# Patient Record
Sex: FEMALE | Race: BLACK OR AFRICAN AMERICAN | Employment: FULL TIME | URBAN - METROPOLITAN AREA
[De-identification: names, ages, dates, MRNs, and addresses within clinical notes are randomized per-mention and may not be internally consistent; named-entity substitution may affect disease eponyms.]

---

## 2017-12-12 ENCOUNTER — TELEPHONE (OUTPATIENT)
Dept: FAMILY MEDICINE CLINIC | Age: 22
End: 2017-12-12

## 2017-12-12 DIAGNOSIS — Z00.00 ROUTINE GENERAL MEDICAL EXAMINATION AT A HEALTH CARE FACILITY: Primary | ICD-10-CM

## 2018-01-03 ENCOUNTER — HOSPITAL ENCOUNTER (OUTPATIENT)
Dept: LAB | Age: 23
Discharge: HOME | End: 2018-01-03

## 2018-01-03 PROCEDURE — 99001 SPECIMEN HANDLING PT-LAB: CPT | Performed by: INTERNAL MEDICINE

## 2018-01-04 LAB
ALBUMIN SERPL-MCNC: 3.8 G/DL (ref 3.5–5.5)
ALBUMIN/GLOB SERPL: 1.3 {RATIO} (ref 1.2–2.2)
ALP SERPL-CCNC: 58 IU/L (ref 39–117)
ALT SERPL-CCNC: 12 IU/L (ref 0–32)
AST SERPL-CCNC: 13 IU/L (ref 0–40)
BILIRUB SERPL-MCNC: <0.2 MG/DL (ref 0–1.2)
BUN SERPL-MCNC: 12 MG/DL (ref 6–20)
BUN/CREAT SERPL: 14 (ref 9–23)
CALCIUM SERPL-MCNC: 8.7 MG/DL (ref 8.7–10.2)
CHLORIDE SERPL-SCNC: 105 MMOL/L (ref 96–106)
CHOLEST SERPL-MCNC: 172 MG/DL (ref 100–199)
CO2 SERPL-SCNC: 25 MMOL/L (ref 18–29)
CREAT SERPL-MCNC: 0.85 MG/DL (ref 0.57–1)
ERYTHROCYTE [DISTWIDTH] IN BLOOD BY AUTOMATED COUNT: 16.6 % (ref 12.3–15.4)
GLOBULIN SER CALC-MCNC: 2.9 G/DL (ref 1.5–4.5)
GLUCOSE SERPL-MCNC: 86 MG/DL (ref 65–99)
HCT VFR BLD AUTO: 33.6 % (ref 34–46.6)
HDLC SERPL-MCNC: 69 MG/DL
HGB BLD-MCNC: 10.8 G/DL (ref 11.1–15.9)
INTERPRETATION, 910389: NORMAL
LDLC SERPL CALC-MCNC: 93 MG/DL (ref 0–99)
MCH RBC QN AUTO: 23.6 PG (ref 26.6–33)
MCHC RBC AUTO-ENTMCNC: 32.1 G/DL (ref 31.5–35.7)
MCV RBC AUTO: 74 FL (ref 79–97)
PLATELET # BLD AUTO: 274 X10E3/UL (ref 150–379)
POTASSIUM SERPL-SCNC: 4.7 MMOL/L (ref 3.5–5.2)
PROT SERPL-MCNC: 6.7 G/DL (ref 6–8.5)
RBC # BLD AUTO: 4.57 X10E6/UL (ref 3.77–5.28)
SODIUM SERPL-SCNC: 142 MMOL/L (ref 134–144)
TRIGL SERPL-MCNC: 52 MG/DL (ref 0–149)
VLDLC SERPL CALC-MCNC: 10 MG/DL (ref 5–40)
WBC # BLD AUTO: 4.8 X10E3/UL (ref 3.4–10.8)

## 2018-01-08 ENCOUNTER — OFFICE VISIT (OUTPATIENT)
Dept: FAMILY MEDICINE CLINIC | Age: 23
End: 2018-01-08

## 2018-01-08 VITALS
SYSTOLIC BLOOD PRESSURE: 94 MMHG | DIASTOLIC BLOOD PRESSURE: 64 MMHG | HEIGHT: 64 IN | WEIGHT: 190 LBS | TEMPERATURE: 98.6 F | HEART RATE: 95 BPM | RESPIRATION RATE: 18 BRPM | BODY MASS INDEX: 32.44 KG/M2 | OXYGEN SATURATION: 97 %

## 2018-01-08 DIAGNOSIS — Z00.00 ROUTINE GENERAL MEDICAL EXAMINATION AT A HEALTH CARE FACILITY: Primary | ICD-10-CM

## 2018-01-08 DIAGNOSIS — Z23 ENCOUNTER FOR IMMUNIZATION: ICD-10-CM

## 2018-01-08 DIAGNOSIS — E55.9 VITAMIN D DEFICIENCY: ICD-10-CM

## 2018-01-08 DIAGNOSIS — E66.09 CLASS 1 OBESITY DUE TO EXCESS CALORIES WITH SERIOUS COMORBIDITY AND BODY MASS INDEX (BMI) OF 32.0 TO 32.9 IN ADULT: ICD-10-CM

## 2018-01-08 RX ORDER — NORETHINDRONE ACETATE AND ETHINYL ESTRADIOL 1MG-20(21)
KIT ORAL
COMMUNITY

## 2018-01-08 RX ORDER — ERGOCALCIFEROL 1.25 MG/1
50000 CAPSULE ORAL
Qty: 12 CAP | Refills: 0 | COMMUNITY
Start: 2018-01-08

## 2018-01-08 NOTE — PATIENT INSTRUCTIONS

## 2018-01-08 NOTE — MR AVS SNAPSHOT
Visit Information Date & Time Provider Department Dept. Phone Encounter #  
 1/8/2018  1:30 PM Neelamstephanie Estrada, [facility] [phone] [id number] Follow-up Instructions Return in about 1 year (around 1/8/2019). Upcoming Health Maintenance Date Due  
 HPV AGE 9Y-34Y (2 of 3 - Female 3 Dose Series) 9/26/2011 DTaP/Tdap/Td series (6 - Td) 1/7/2018 PAP AKA CERVICAL CYTOLOGY 12/1/2020 Allergies as of 1/8/2018  Review Complete On: 1/8/2018 By: Neelam Estrada MD  
  
 Severity Noted Reaction Type Reactions Baby Oil [Lanolin-mineral Oil]  05/16/2012    Other (comments) Red patches on skin Current Immunizations  Reviewed on 8/12/2013 Name Date DTAP Vaccine 9/25/1998, 4/5/1995, 5/16/1994, 1/4/1994, 10/26/1983 HIB Vaccine 4/5/1995, 5/16/1994, 1/4/1994, 1993 Hep B Vaccine (Adult) 8/12/2013 Hepatitis A Vaccine 8/1/2011 Hepatitis B Vaccine 5/16/1994, 1993, 1995 Human Papillomavirus 8/1/2011 MMR Vaccine 9/25/1998, 9/28/1994 Meningococcal Vaccine 8/1/2011 OPV 9/25/1998, 5/16/1994, 1/4/1994, 1993 PPD 5/16/2012 TB Skin Test (PPD) Intradermal 8/12/2013 TDAP Vaccine 1/7/2008 Tdap  Incomplete Not reviewed this visit You Were Diagnosed With   
  
 Codes Comments Routine general medical examination at a health care facility    -  Primary ICD-10-CM: Z00.00 ICD-9-CM: V70.0 Class 1 obesity due to excess calories with serious comorbidity and body mass index (BMI) of 32.0 to 32.9 in adult     ICD-10-CM: E66.09, Z68.32 
ICD-9-CM: 278.00, V85.32 Encounter for immunization     ICD-10-CM: D27 ICD-9-CM: V03.89 Vitamin D deficiency     ICD-10-CM: E55.9 ICD-9-CM: 268.9 Vitals BP Pulse Temp Resp Height(growth percentile) Weight(growth percentile) 94/64 (BP 1 Location: Left arm, BP Patient Position: Sitting) 95 98.6 °F (37 °C) 18 5' 4\" (1.626 m) 190 lb (86.2 kg) SpO2 BMI OB Status Smoking Status 97% 32.61 kg/m2 Having regular periods Never Smoker BMI and BSA Data Body Mass Index Body Surface Area  
 32.61 kg/m 2 1.97 m 2 Preferred Pharmacy Pharmacy Name Phone [address] Ashley Almaguer [phone] Your Updated Medication List  
  
   
This list is accurate as of: 1/8/18  2:05 PM.  Always use your most recent med list.  
  
  
  
  
 FLONASE 50 mcg/actuation nasal spray Generic drug:  fluticasone  
nightly. MICROGESTIN FE 1/20 (28) 1 mg-20 mcg (21)/75 mg (7) Tab Generic drug:  norethindrone-ethinyl estradiol Take  by mouth. OTHER  
  
 VITAMIN D2 50,000 unit capsule Generic drug:  ergocalciferol Take 1 Cap by mouth every seven (7) days. We Performed the Following [place] IMMUNIZ ADMIN,1 SINGLE/COMB VAC/TOXOID [id number] CPT(R)] TETANUS, DIPHTHERIA TOXOIDS AND ACELLULAR PERTUSSIS VACCINE (TDAP), IN INDIVIDS. >=7, IM [id number] CPT(R)] Follow-up Instructions Return in about 1 year (around 1/8/2019). Patient Instructions Starting a Weight Loss Plan: Care Instructions Your Care Instructions If you are thinking about losing weight, it can be hard to know where to start. Your doctor can help you set up a weight loss plan that best meets your needs. You may want to take a class on nutrition or exercise, or join a weight loss support group. If you have questions about how to make changes to your eating or exercise habits, ask your doctor about seeing a registered dietitian or an exercise specialist. 
It can be a big challenge to lose weight. But you do not have to make huge changes at once. Make small changes, and stick with them. When those changes become habit, add a few more changes.  
If you do not think you are ready to make changes right now, try to pick a date in the future. Make an appointment to see your doctor to discuss whether the time is right for you to start a plan. Follow-up care is a key part of your treatment and safety. Be sure to make and go to all appointments, and call your doctor if you are having problems. It's also a good idea to know your test results and keep a list of the medicines you take. How can you care for yourself at home? · Set realistic goals. Many people expect to lose much more weight than is likely. A weight loss of 5% to 10% of your body weight may be enough to improve your health. · Get family and friends involved to provide support. Talk to them about why you are trying to lose weight, and ask them to help. They can help by participating in exercise and having meals with you, even if they may be eating something different. · Find what works best for you. If you do not have time or do not like to cook, a program that offers meal replacement bars or shakes may be better for you. Or if you like to prepare meals, finding a plan that includes daily menus and recipes may be best. 
· Ask your doctor about other health professionals who can help you achieve your weight loss goals. ¨ A dietitian can help you make healthy changes in your diet. ¨ An exercise specialist or  can help you develop a safe and effective exercise program. 
¨ A counselor or psychiatrist can help you cope with issues such as depression, anxiety, or family problems that can make it hard to focus on weight loss. · Consider joining a support group for people who are trying to lose weight. Your doctor can suggest groups in your area. Where can you learn more? Go to http://morgan-edi.info/. Enter W786 in the search box to learn more about \"Starting a Weight Loss Plan: Care Instructions. \" Current as of: October 13, 2016 Content Version: 11.4 © 5898-0846 Healthwise, Incorporated.  Care instructions adapted under license by [address] (which disclaims liability or warranty for this information). If you have questions about a medical condition or this instruction, always ask your healthcare professional. [address] any warranty or liability for your use of this information. Introducing [facility]! Daniela Alvarez introduces ZipList patient portal. Now you can access parts of your medical record, email your doctor's office, and request medication refills online. 1. In your internet browser, go to [email or web address] 2. Click on the First Time User? Click Here link in the Sign In box. You will see the New Member Sign Up page. 3. Enter your ZipList Access Code exactly as it appears below. You will not need to use this code after youve completed the sign-up process. If you do not sign up before the expiration date, you must request a new code. · ZipList Access Code: [email or web address] Expires: 4/8/2018  1:24 PM 
 
4. Enter the last four digits of your Social Security Number (xxxx) and Date of Birth (mm/dd/yyyy) as indicated and click Submit. You will be taken to the next sign-up page. 5. Create a ZipList ID. This will be your ZipList login ID and cannot be changed, so think of one that is secure and easy to remember. 6. Create a ZipList password. You can change your password at any time. 7. Enter your Password Reset Question and Answer. This can be used at a later time if you forget your password. 8. Enter your e-mail address. You will receive e-mail notification when new information is available in [address] 9. Click Sign Up. You can now view and download portions of your medical record. 10. Click the Download Summary menu link to download a portable copy of your medical information. If you have questions, please visit the Frequently Asked Questions section of the ZipList website.  Remember, ZipList is NOT to be used for urgent needs. For medical emergencies, dial 911. Now available from your iPhone and Android! Please provide this summary of care documentation to your next provider. Your primary care clinician is listed as Gibran Kendall. If you have any questions after today's visit, please call [phone].

## 2018-01-08 NOTE — PROGRESS NOTES
Assessment/Plan:    1. Routine general medical examination at a health care facility  -labs good    2. Class 1 obesity due to excess calories with serious comorbidity and body mass index (BMI) of 32.0 to 32.9 in adult  -she is working on diet/exercise    3. Encounter for immunization  - Tetanus, diphtheria toxoids and acellular pertussis (TDAP) vaccine, in individuals >=7 years, IM  - IN IMMUNIZ ADMIN,1 SINGLE/COMB VAC/TOXOID    4. Vitamin D deficiency  -on prescription dose from gyn. Then will take otc. The plan was discussed with the patient. The patient verbalized understanding and is in agreement with the plan. All medication potential side effects were discussed with the patient. Health Maintenance:   Health Maintenance   Topic Date Due    HPV AGE 9Y-34Y (2 of 3 - Female 3 Dose Series) 09/26/2011    DTaP/Tdap/Td series (6 - Td) 01/07/2018    PAP AKA CERVICAL CYTOLOGY  12/01/2020    Influenza Age 5 to Adult  Addressed       Ludmila Shelby is a 23 y.o. female and presents with Complete Physical     Subjective:  Here for physical.  Labs good, except for mild anemia. Obesity - is trying to eliminate fast food. Is working to increase her exercise. ROS:  Constitutional: No recent weight change. No weakness/fatigue. No f/c. Skin: No rashes, change in nails/hair, itching   HENT: No HA, dizziness. No hearing loss/tinnitus. No nasal congestion/discharge. Eyes: No change in vision, double/blurred vision or eye pain/redness. Cardiovascular: No CP/palpitations. No AWAD/orthopnea/PND. Respiratory: No cough/sputum, dyspnea, wheezing. Gastointestinal: No dysphagia, reflux. No n/v. No constipation/diarrhea. No melena/rectal bleeding. Genitourinary: No dysuria, urinary hesitancy, nocturia, hematuria. No incontinence. Musculoskeletal: No joint pain/stiffness. No muscle pain/tenderness. Endo: No heat/cold intolerance, no polyuria/polydypsia. Heme: + h/o anemia.   No easy bleeding/bruising. Allergy/Immunology: + seasonal rhinitis. Denies frequent colds, sinus/ear infections. Neurological: No seizures/numbness/weakness. No paresthesias. Psychiatric:  No depression, anxiety. The problem list was updated as a part of today's visit. Patient Active Problem List   Diagnosis Code    Obesity (BMI 30-39. 9) E66.9       The PSH, FH were reviewed. SH:  Social History   Substance Use Topics    Smoking status: Never Smoker    Smokeless tobacco: Never Used    Alcohol use No       Medications/Allergies:  Current Outpatient Prescriptions on File Prior to Visit   Medication Sig Dispense Refill    fluticasone (FLONASE) 50 mcg/actuation nasal spray nightly.  norethindrone-ethinyl estradiol (JUNEL FE 1/20) 1 mg-20 mcg (21)/75 mg (7) tab Take  by mouth.  ferrous sulfate 324 mg (65 mg iron) tablet Take  by mouth Daily (before breakfast).  multivitamin (ONE A DAY) tablet Take 1 Tab by mouth daily. No current facility-administered medications on file prior to visit. Allergies   Allergen Reactions    Baby Oil [Lanolin-Mineral Oil] Other (comments)     Red patches on skin       Objective:  Visit Vitals    BP 94/64 (BP 1 Location: Left arm, BP Patient Position: Sitting)    Pulse 95    Temp 98.6 °F (37 °C)    Resp 18    Ht 5' 4\" (1.626 m)    Wt 190 lb (86.2 kg)    SpO2 97%    BMI 32.61 kg/m2      Constitutional: Well developed, nourished, no distress, alert, obese habitus   HENT: Exterior ears and tympanic membranes normal bilaterally. Supple neck. No thyromegaly or lymphadenopathy. Oropharynx clear and moist mucous membranes. Eyes: Conjunctiva normal. PERRL. CV: S1, S2.  RRR. No murmurs/rubs. No thrills palpated. No carotid bruits. Intact distal pulses. No edema. Pulm: No abnormalities on inspection. Clear to auscultation bilaterally. No wheezing/rhonchi. Normal effort. GI: Soft, nontender, nondistended. Normal active bowel sounds. MS: Gait normal.  Joints without deformity/tenderness. Strength intact bilateral upper and lower ext. Normal ROM all extremities. Neuro: A/O x 3. No focal motor or sensory deficits. Speech normal.   Skin: No lesions/rashes on inspection. Psych: Appropriate affect, judgement and insight. Short-term memory intact. Labwork and Ancillary Studies:    CBC w/Diff  Lab Results   Component Value Date/Time    WBC 4.8 01/03/2018 07:48 AM    HGB 10.8 01/03/2018 07:48 AM    PLATELET 919 88/46/9087 07:48 AM         Basic Metabolic Profile/LFTs  Lab Results   Component Value Date/Time    Sodium 142 01/03/2018 07:48 AM    Potassium 4.7 01/03/2018 07:48 AM    Chloride 105 01/03/2018 07:48 AM    CO2 25 01/03/2018 07:48 AM    Glucose 86 01/03/2018 07:48 AM    BUN 12 01/03/2018 07:48 AM    Creatinine 0.85 01/03/2018 07:48 AM    BUN/Creatinine ratio 14 01/03/2018 07:48 AM    GFR est  01/03/2018 07:48 AM    GFR est non-AA 96 01/03/2018 07:48 AM    Calcium 8.7 01/03/2018 07:48 AM      Lab Results   Component Value Date/Time    ALT (SGPT) 12 01/03/2018 07:48 AM    AST (SGOT) 13 01/03/2018 07:48 AM    Alk.  phosphatase 58 01/03/2018 07:48 AM    Bilirubin, total <0.2 01/03/2018 07:48 AM       Cholesterol  Lab Results   Component Value Date/Time    Cholesterol, total 172 01/03/2018 07:48 AM    HDL Cholesterol 69 01/03/2018 07:48 AM    LDL, calculated 93 01/03/2018 07:48 AM    Triglyceride 52 01/03/2018 07:48 AM

## 2018-01-08 NOTE — PROGRESS NOTES
Chacho Liriano is a 23 y.o. female (: 1995) presenting to address:    Chief Complaint   Patient presents with    Complete Physical       Vitals:    18 1340   BP: 94/64   Pulse: 95   Resp: 18   Temp: 98.6 °F (37 °C)   SpO2: 97%   Weight: 190 lb (86.2 kg)   Height: 5' 4\" (1.626 m)   PainSc:   0 - No pain       Hearing/Vision:   No exam data present    Learning Assessment:     Learning Assessment 2013   PRIMARY LEARNER Patient   HIGHEST LEVEL OF EDUCATION - PRIMARY LEARNER  GRADUATED HIGH SCHOOL OR GED   BARRIERS PRIMARY LEARNER NONE   CO-LEARNER CAREGIVER No   PRIMARY LANGUAGE ENGLISH   LEARNER PREFERENCE PRIMARY DEMONSTRATION   LEARNING SPECIAL TOPICS No   ANSWERED BY Patient   RELATIONSHIP SELF     Depression Screening:     PHQ over the last two weeks 2018   Little interest or pleasure in doing things Not at all   Feeling down, depressed or hopeless Not at all   Total Score PHQ 2 0     Fall Risk Assessment:   No flowsheet data found. Abuse Screening:     Abuse Screening Questionnaire 2018   Do you ever feel afraid of your partner? N   Are you in a relationship with someone who physically or mentally threatens you? N   Is it safe for you to go home? Y     Coordination of Care Questionaire:   1. Have you been to the ER, urgent care clinic since your last visit? Hospitalized since your last visit? No     2. Have you seen or consulted any other health care providers outside of the [address] [place] [address] since your last visit? Include any pap smears or colon screening. Yes, Dr. Mikala Woodward pap smear/labs Dec 2017, Dr. Grant Brown eye exam Dec 2017 , dental   Advanced Directive:   1. Do you have an Advanced Directive? No     2. Would you like information on Advanced Directives?   Yes   Health Maintenance Due   Topic Date Due    HPV AGE 9Y-34Y (2 of 3 - Female 3 Dose Series) 2011    Influenza Age 5 to Adult  2017    DTaP/Tdap/Td series (6 - Td) 2018     Declines flu Pt states gyne started her on Vitamin D once per week. Immunization/s administered 1/8/2018 by Sen Madrigal, ALEXEY with guardian's consent. Patient tolerated procedure well. No reactions noted. TDAP left deltoid.

## 2018-07-31 ENCOUNTER — OFFICE VISIT (OUTPATIENT)
Dept: FAMILY MEDICINE CLINIC | Age: 23
End: 2018-07-31

## 2018-07-31 VITALS
HEIGHT: 64 IN | TEMPERATURE: 98.9 F | WEIGHT: 208 LBS | HEART RATE: 88 BPM | DIASTOLIC BLOOD PRESSURE: 78 MMHG | SYSTOLIC BLOOD PRESSURE: 112 MMHG | OXYGEN SATURATION: 99 % | RESPIRATION RATE: 16 BRPM | BODY MASS INDEX: 35.51 KG/M2

## 2018-07-31 DIAGNOSIS — L03.012 CELLULITIS OF FINGER OF LEFT HAND: Primary | ICD-10-CM

## 2018-07-31 PROBLEM — E66.01 SEVERE OBESITY (BMI 35.0-39.9): Status: ACTIVE | Noted: 2018-07-31

## 2018-07-31 RX ORDER — BISMUTH SUBSALICYLATE 262 MG
1 TABLET,CHEWABLE ORAL DAILY
COMMUNITY

## 2018-07-31 RX ORDER — CEPHALEXIN 500 MG/1
500 CAPSULE ORAL 3 TIMES DAILY
Qty: 30 CAP | Refills: 0 | Status: SHIPPED | OUTPATIENT
Start: 2018-07-31 | End: 2018-08-10

## 2018-07-31 NOTE — PROGRESS NOTES
Kami Finch is a 23 y.o. female (: 1995) presenting to address:    Chief Complaint   Patient presents with    Finger Swelling     LT hand          pain scale 2/10       Vitals:    18 1353   BP: 112/78   Pulse: 88   Resp: 16   Temp: 98.9 °F (37.2 °C)   TempSrc: Oral   SpO2: 99%   Weight: 208 lb (94.3 kg)   Height: 5' 4\" (1.626 m)   PainSc:   2   PainLoc: Finger       Hearing/Vision:   No exam data present    Learning Assessment:     Learning Assessment 2013   PRIMARY LEARNER Patient   HIGHEST LEVEL OF EDUCATION - PRIMARY LEARNER  GRADUATED HIGH SCHOOL OR GED   BARRIERS PRIMARY LEARNER NONE   CO-LEARNER CAREGIVER No   PRIMARY LANGUAGE ENGLISH   LEARNER PREFERENCE PRIMARY DEMONSTRATION   LEARNING SPECIAL TOPICS No   ANSWERED BY Patient   RELATIONSHIP SELF     Depression Screening:     PHQ over the last two weeks 2018   Little interest or pleasure in doing things Not at all   Feeling down, depressed, irritable, or hopeless Not at all   Total Score PHQ 2 0     Fall Risk Assessment:   No flowsheet data found. Abuse Screening:     Abuse Screening Questionnaire 2018   Do you ever feel afraid of your partner? N   Are you in a relationship with someone who physically or mentally threatens you? N   Is it safe for you to go home? Y     Coordination of Care Questionaire:   1. Have you been to the ER, urgent care clinic since your last visit? Hospitalized since your last visit? NO    2. Have you seen or consulted any other health care providers outside of the [address] [place] [address] since your last visit? Include any pap smears or colon screening. NO    Advanced Directive:   1. Do you have an Advanced Directive? NO    2. Would you like information on Advanced Directives?  NO

## 2018-07-31 NOTE — PROGRESS NOTES
Assessment/Plan:    *Diagnoses and all orders for this visit:    1. Cellulitis of finger of left hand    Other orders  -     cephALEXin (KEFLEX) 500 mg capsule; Take 1 Cap by mouth three (3) times daily for 10 days. Will call if she is not improving. The plan was discussed with the patient. The patient verbalized understanding and is in agreement with the plan. All medication potential side effects were discussed with the patient.    -------------------------------------------------------------------------------------------------------------------        Nataliya Solis is a 23 y.o. female and presents with Finger Swelling (LT hand          pain scale 2/10)         Subjective:  Pt here for an issue with her LT index finger. She pulled on a hangnail 3-4 days ago and now has redness, swelling, pain, pus that is visible. She has been soaking it in warm water but nothing else. ROS:  Constitutional: No recent weight change. No weakness/fatigue. No f/c. Skin: No rashes, change in nails/hair, itching   HENT: No HA, dizziness. No hearing loss/tinnitus. No nasal congestion/discharge. Eyes: No change in vision, double/blurred vision or eye pain/redness. Cardiovascular: No CP/palpitations. No AWAD/orthopnea/PND. Respiratory: No cough/sputum, dyspnea, wheezing. Gastointestinal: No dysphagia, reflux. No n/v. No constipation/diarrhea. No melena/rectal bleeding. Genitourinary: No dysuria, urinary hesitancy, nocturia, hematuria. No incontinence. Musculoskeletal: No joint pain/stiffness. No muscle pain/tenderness. Endo: No heat/cold intolerance, no polyuria/polydypsia. Heme: No h/o anemia. No easy bleeding/bruising. Allergy/Immunology: No seasonal rhinitis. Denies frequent colds, sinus/ear infections. Neurological: No seizures/numbness/weakness. No paresthesias. Psychiatric:  No depression, anxiety. The problem list was updated as a part of today's visit.   Patient Active Problem List   Diagnosis Code    Obesity (BMI 30-39. 9) E66.9    Vitamin D deficiency E55.9    Severe obesity (BMI 35.0-39.9) ([facility]) E66.01       The PSH, FH were reviewed. SH:  Social History   Substance Use Topics    Smoking status: Never Smoker    Smokeless tobacco: Never Used    Alcohol use No       Medications/Allergies:  Current Outpatient Prescriptions on File Prior to Visit   Medication Sig Dispense Refill    norethindrone-ethinyl estradiol (MICROGESTIN FE 1/20, 28,) 1 mg-20 mcg (21)/75 mg (7) tab Take  by mouth.  OTHER       ergocalciferol (VITAMIN D2) 50,000 unit capsule Take 1 Cap by mouth every seven (7) days. 12 Cap 0    fluticasone (FLONASE) 50 mcg/actuation nasal spray nightly. No current facility-administered medications on file prior to visit. Allergies   Allergen Reactions    Baby Oil [Lanolin-Mineral Oil] Other (comments)     Red patches on skin         Health Maintenance:   Health Maintenance   Topic Date Due    HPV Age 9Y-34Y (2 of 1 - Female 3 Dose Series) 09/26/2011    Influenza Age 5 to Adult  08/01/2018    PAP AKA CERVICAL CYTOLOGY  12/01/2020    DTaP/Tdap/Td series (7 - Td) 01/08/2028       Objective:  Visit Vitals    /78 (BP 1 Location: Left arm, BP Patient Position: Sitting)  Comment: manual    Pulse 88    Temp 98.9 °F (37.2 °C) (Oral)    Resp 16    Ht 5' 4\" (1.626 m)    Wt 208 lb (94.3 kg)    SpO2 99%    BMI 35.7 kg/m2          Nurses notes and VS reviewed. Physical Examination: General appearance - alert, well appearing, and in no distress  Extremities - LT index finger swelling, TTP, erythema and bacteria along the cuticle.         Labwork and Ancillary Studies:    CBC w/Diff  Lab Results   Component Value Date/Time    WBC 4.8 01/03/2018 07:48 AM    HGB 10.8 (L) 01/03/2018 07:48 AM    PLATELET 913 39/60/4240 07:48 AM         Basic Metabolic Profile  Lab Results   Component Value Date/Time    Sodium 142 01/03/2018 07:48 AM Potassium 4.7 01/03/2018 07:48 AM    Chloride 105 01/03/2018 07:48 AM    CO2 25 01/03/2018 07:48 AM    Glucose 86 01/03/2018 07:48 AM    BUN 12 01/03/2018 07:48 AM    Creatinine 0.85 01/03/2018 07:48 AM    BUN/Creatinine ratio 14 01/03/2018 07:48 AM    GFR est  01/03/2018 07:48 AM    GFR est non-AA 96 01/03/2018 07:48 AM    Calcium 8.7 01/03/2018 07:48 AM         LFT  Lab Results   Component Value Date/Time    ALT (SGPT) 12 01/03/2018 07:48 AM    AST (SGOT) 13 01/03/2018 07:48 AM    Alk.  phosphatase 58 01/03/2018 07:48 AM    Bilirubin, total <0.2 01/03/2018 07:48 AM         Cholesterol  Lab Results   Component Value Date/Time    Cholesterol, total 172 01/03/2018 07:48 AM    HDL Cholesterol 69 01/03/2018 07:48 AM    LDL, calculated 93 01/03/2018 07:48 AM    Triglyceride 52 01/03/2018 07:48 AM

## 2018-07-31 NOTE — MR AVS SNAPSHOT
[address] [place] [address] [address] 6262 [facility] [id number] [phone] Patient: Roxane Salamanca MRN: [id number] RII:[id number] Visit Information Date & Time Provider Department Dept. Phone Encounter #  
 7/31/2018  2:00 PM Dillan Guzman, [employer] [phone] Upcoming Health Maintenance Date Due  
 HPV Age 9Y-34Y (2 of 1 - Female 3 Dose Series) 9/26/2011 Influenza Age 5 to Adult 8/1/2018 PAP AKA CERVICAL CYTOLOGY 12/1/2020 DTaP/Tdap/Td series (7 - Td) 1/8/2028 Allergies as of 7/31/2018  Review Complete On: 7/31/2018 By: Zenaida Montero Severity Noted Reaction Type Reactions Baby Oil [Lanolin-mineral Oil]  05/16/2012    Other (comments) Red patches on skin Current Immunizations  Reviewed on 1/8/2018 Name Date DTAP Vaccine 9/25/1998, 4/5/1995, 5/16/1994, 1/4/1994, 10/26/1983 HIB Vaccine 4/5/1995, 5/16/1994, 1/4/1994, 1993 Hep B Vaccine (Adult) 8/12/2013 Hepatitis A Vaccine 8/1/2011 Hepatitis B Vaccine 5/16/1994, 1995, 1995 Human Papillomavirus 8/1/2011 MMR Vaccine 9/25/1998, 9/28/1994 Meningococcal Vaccine 8/1/2011 OPV 9/25/1998, 5/16/1994, 1/4/1994, 1993 PPD 5/16/2012 TB Skin Test (PPD) Intradermal 8/12/2013 TDAP Vaccine 1/7/2008 Tdap 1/8/2018  2:14 PM  
  
 Not reviewed this visit You Were Diagnosed With   
  
 Codes Comments Cellulitis of finger of left hand    -  Primary ICD-10-CM: L03.012 
ICD-9-CM: 681.00 Vitals BP Pulse Temp Resp Height(growth percentile) Weight(growth percentile) 112/78 (BP 1 Location: Left arm, BP Patient Position: Sitting) 88 98.9 °F (37.2 °C) (Oral) 16 5' 4\" (1.626 m) 208 lb (94.3 kg) SpO2 BMI OB Status Smoking Status 99% 35.7 kg/m2 Having regular periods Never Smoker Vitals History BMI and BSA Data Body Mass Index Body Surface Area 35.7 kg/m 2 2.06 m 2 Preferred Pharmacy Pharmacy Name Phone [address], [address] [facility]. [place] [address] [phone] Your Updated Medication List  
  
   
This list is accurate as of 7/31/18  2:18 PM.  Always use your most recent med list.  
  
  
  
  
 cephALEXin 500 mg capsule Commonly known as:  Favio Fogo Take 1 Cap by mouth three (3) times daily for 10 days. FLONASE 50 mcg/actuation nasal spray Generic drug:  fluticasone  
nightly. MICROGESTIN FE 1/20 (28) 1 mg-20 mcg (21)/75 mg (7) Tab Generic drug:  norethindrone-ethinyl estradiol Take  by mouth.  
  
 multivitamin tablet Commonly known as:  ONE A DAY Take 1 Tab by mouth daily. OTHER  
  
 VITAMIN D2 50,000 unit capsule Generic drug:  ergocalciferol Take 1 Cap by mouth every seven (7) days. Prescriptions Sent to Pharmacy Refills  
 cephALEXin (KEFLEX) 500 mg capsule 0 Sig: Take 1 Cap by mouth three (3) times daily for 10 days. Class: Normal  
 Pharmacy: [place] [address] [facility], [address] #: [phone] Route: Oral  
  
Patient Instructions Cellulitis: Care Instructions Your Care Instructions Cellulitis is a skin infection caused by bacteria, most often strep or staph. It often occurs after a break in the skin from a scrape, cut, bite, or puncture, or after a rash. Cellulitis may be treated without doing tests to find out what caused it. But your doctor may do tests, if needed, to look for a specific bacteria, like methicillin-resistant Staphylococcus aureus (MRSA). The doctor has checked you carefully, but problems can develop later. If you notice any problems or new symptoms, get medical treatment right away. Follow-up care is a key part of your treatment and safety.  Be sure to make and go to all appointments, and call your doctor if you are having problems. It's also a good idea to know your test results and keep a list of the medicines you take. How can you care for yourself at home? · Take your antibiotics as directed. Do not stop taking them just because you feel better. You need to take the full course of antibiotics. · Prop up the infected area on pillows to reduce pain and swelling. Try to keep the area above the level of your heart as often as you can. · If your doctor told you how to care for your wound, follow your doctor's instructions. If you did not get instructions, follow this general advice: ¨ Wash the wound with clean water 2 times a day. Don't use hydrogen peroxide or alcohol, which can slow healing. ¨ You may cover the wound with a thin layer of petroleum jelly, such as Vaseline, and a nonstick bandage. ¨ Apply more petroleum jelly and replace the bandage as needed. · Be safe with medicines. Take pain medicines exactly as directed. ¨ If the doctor gave you a prescription medicine for pain, take it as prescribed. ¨ If you are not taking a prescription pain medicine, ask your doctor if you can take an over-the-counter medicine. To prevent cellulitis in the future · Try to prevent cuts, scrapes, or other injuries to your skin. Cellulitis most often occurs where there is a break in the skin. · If you get a scrape, cut, mild burn, or bite, wash the wound with clean water as soon as you can to help avoid infection. Don't use hydrogen peroxide or alcohol, which can slow healing. · If you have swelling in your legs (edema), support stockings and good skin care may help prevent leg sores and cellulitis. · Take care of your feet, especially if you have diabetes or other conditions that increase the risk of infection. Wear shoes and socks. Do not go barefoot. If you have athlete's foot or other skin problems on your feet, talk to your doctor about how to treat them. When should you call for help? Call your doctor now or seek immediate medical care if: 
  · You have signs that your infection is getting worse, such as: 
¨ Increased pain, swelling, warmth, or redness. ¨ Red streaks leading from the area. ¨ Pus draining from the area. ¨ A fever.  
  · You get a rash.  
 Watch closely for changes in your health, and be sure to contact your doctor if: 
  · You do not get better as expected. Where can you learn more? Go to http://morgan-edi.info/. Maverick Escamilla in the search box to learn more about \"Cellulitis: Care Instructions. \" Current as of: May 10, 2017 Content Version: 11.7 © 0940-7694 [employer]. Care instructions adapted under license by [employer] (which disclaims liability or warranty for this information). If you have questions about a medical condition or this instruction, always ask your healthcare professional. [place] [address] any warranty or liability for your use of this information. Introducing [facility]! [facility] introduces [employer] patient portal. Now you can access parts of your medical record, email your doctor's office, and request medication refills online. 1. In your internet browser, go to [email or web address] 2. Click on the First Time User? Click Here link in the Sign In box. You will see the New Member Sign Up page. 3. Enter your [employer] Access Code exactly as it appears below. You will not need to use this code after youve completed the sign-up process. If you do not sign up before the expiration date, you must request a new code. · [employer] Access Code: [email or web address] Expires: 10/29/2018  2:18 PM 
 
4. Enter the last four digits of your Social Security Number (xxxx) and Date of Birth (mm/dd/yyyy) as indicated and click Submit. You will be taken to the next sign-up page. 5. Create a Webroot ID. This will be your Webroot login ID and cannot be changed, so think of one that is secure and easy to remember. 6. Create a Webroot password. You can change your password at any time. 7. Enter your Password Reset Question and Answer. This can be used at a later time if you forget your password. 8. Enter your e-mail address. You will receive e-mail notification when new information is available in [address] 9. Click Sign Up. You can now view and download portions of your medical record. 10. Click the Download Summary menu link to download a portable copy of your medical information. If you have questions, please visit the Frequently Asked Questions section of the Webroot website. Remember, Webroot is NOT to be used for urgent needs. For medical emergencies, dial 911. Now available from your iPhone and Android! Please provide this summary of care documentation to your next provider. Your primary care clinician is listed as Gibran 13. If you have any questions after today's visit, please call [phone].

## 2019-02-15 ENCOUNTER — TELEPHONE (OUTPATIENT)
Dept: FAMILY MEDICINE CLINIC | Age: 24
End: 2019-02-15

## 2019-02-15 DIAGNOSIS — E55.9 VITAMIN D DEFICIENCY: ICD-10-CM

## 2019-02-15 DIAGNOSIS — Z00.00 ROUTINE GENERAL MEDICAL EXAMINATION AT A HEALTH CARE FACILITY: Primary | ICD-10-CM

## 2019-02-15 NOTE — TELEPHONE ENCOUNTER
Please place lab order.   Future Appointments   Date Time Provider Katerine Nani   3/15/2019  9:00 AM Debbie Johnson MD [facility]

## 2019-03-13 ENCOUNTER — HOSPITAL ENCOUNTER (OUTPATIENT)
Dept: LAB | Age: 24
Discharge: HOME | End: 2019-03-13
Payer: COMMERCIAL

## 2019-03-13 DIAGNOSIS — Z11.3 SCREEN FOR STD (SEXUALLY TRANSMITTED DISEASE): ICD-10-CM

## 2019-03-13 DIAGNOSIS — Z00.00 ROUTINE GENERAL MEDICAL EXAMINATION AT A HEALTH CARE FACILITY: ICD-10-CM

## 2019-03-13 DIAGNOSIS — E55.9 VITAMIN D DEFICIENCY: ICD-10-CM

## 2019-03-13 LAB
ERYTHROCYTE [DISTWIDTH] IN BLOOD BY AUTOMATED COUNT: 14.4 % (ref 11.6–14.5)
HCT VFR BLD AUTO: 36.9 % (ref 35–45)
HGB BLD-MCNC: 11.8 G/DL (ref 12–16)
MCH RBC QN AUTO: 25.4 PG (ref 24–34)
MCHC RBC AUTO-ENTMCNC: 32 G/DL (ref 31–37)
MCV RBC AUTO: 79.5 FL (ref 74–97)
PLATELET # BLD AUTO: 288 K/UL (ref 135–420)
PMV BLD AUTO: 11.3 FL (ref 9.2–11.8)
RBC # BLD AUTO: 4.64 M/UL (ref 4.2–5.3)
WBC # BLD AUTO: 6.3 K/UL (ref 4.6–13.2)

## 2019-03-13 PROCEDURE — 82306 VITAMIN D 25 HYDROXY: CPT

## 2019-03-13 PROCEDURE — 86780 TREPONEMA PALLIDUM: CPT

## 2019-03-13 PROCEDURE — 85027 COMPLETE CBC AUTOMATED: CPT

## 2019-03-13 PROCEDURE — 80053 COMPREHEN METABOLIC PANEL: CPT

## 2019-03-13 PROCEDURE — 36415 COLL VENOUS BLD VENIPUNCTURE: CPT

## 2019-03-13 PROCEDURE — 80061 LIPID PANEL: CPT

## 2019-03-14 ENCOUNTER — OFFICE VISIT (OUTPATIENT)
Dept: FAMILY MEDICINE CLINIC | Age: 24
End: 2019-03-14

## 2019-03-14 VITALS
DIASTOLIC BLOOD PRESSURE: 72 MMHG | RESPIRATION RATE: 20 BRPM | TEMPERATURE: 98.2 F | SYSTOLIC BLOOD PRESSURE: 100 MMHG | HEART RATE: 87 BPM | OXYGEN SATURATION: 99 % | HEIGHT: 64 IN | BODY MASS INDEX: 38.55 KG/M2 | WEIGHT: 225.8 LBS

## 2019-03-14 DIAGNOSIS — Z00.00 ROUTINE GENERAL MEDICAL EXAMINATION AT A HEALTH CARE FACILITY: Primary | ICD-10-CM

## 2019-03-14 DIAGNOSIS — Z13.6 SCREENING FOR CARDIOVASCULAR CONDITION: ICD-10-CM

## 2019-03-14 DIAGNOSIS — Z11.1 SCREENING-PULMONARY TB: ICD-10-CM

## 2019-03-14 DIAGNOSIS — Z11.3 SCREEN FOR STD (SEXUALLY TRANSMITTED DISEASE): ICD-10-CM

## 2019-03-14 DIAGNOSIS — E66.09 CLASS 2 OBESITY DUE TO EXCESS CALORIES WITHOUT SERIOUS COMORBIDITY WITH BODY MASS INDEX (BMI) OF 38.0 TO 38.9 IN ADULT: ICD-10-CM

## 2019-03-14 DIAGNOSIS — F33.8 SEASONAL AFFECTIVE DISORDER (HCC): ICD-10-CM

## 2019-03-14 LAB
25(OH)D3 SERPL-MCNC: 24 NG/ML (ref 30–100)
ALBUMIN SERPL-MCNC: 3.6 G/DL (ref 3.4–5)
ALBUMIN/GLOB SERPL: 1.1 {RATIO} (ref 0.8–1.7)
ALP SERPL-CCNC: 88 U/L (ref 45–117)
ALT SERPL-CCNC: 15 U/L (ref 13–56)
ANION GAP SERPL CALC-SCNC: 4 MMOL/L (ref 3–18)
AST SERPL-CCNC: 9 U/L (ref 15–37)
BILIRUB SERPL-MCNC: 0.2 MG/DL (ref 0.2–1)
BUN SERPL-MCNC: 6 MG/DL (ref 7–18)
BUN/CREAT SERPL: 8 (ref 12–20)
CALCIUM SERPL-MCNC: 8.7 MG/DL (ref 8.5–10.1)
CHLORIDE SERPL-SCNC: 107 MMOL/L (ref 100–108)
CHOLEST SERPL-MCNC: 140 MG/DL
CO2 SERPL-SCNC: 28 MMOL/L (ref 21–32)
CREAT SERPL-MCNC: 0.79 MG/DL (ref 0.6–1.3)
GLOBULIN SER CALC-MCNC: 3.3 G/DL (ref 2–4)
GLUCOSE SERPL-MCNC: 76 MG/DL (ref 74–99)
HDLC SERPL-MCNC: 66 MG/DL (ref 40–60)
HDLC SERPL: 2.1 {RATIO} (ref 0–5)
LDLC SERPL CALC-MCNC: 64 MG/DL (ref 0–100)
LIPID PROFILE,FLP: ABNORMAL
POTASSIUM SERPL-SCNC: 4.2 MMOL/L (ref 3.5–5.5)
PROT SERPL-MCNC: 6.9 G/DL (ref 6.4–8.2)
SODIUM SERPL-SCNC: 139 MMOL/L (ref 136–145)
TRIGL SERPL-MCNC: 50 MG/DL (ref ?–150)
VLDLC SERPL CALC-MCNC: 10 MG/DL

## 2019-03-14 RX ORDER — CHOLECALCIFEROL (VITAMIN D3) 125 MCG
CAPSULE ORAL
COMMUNITY

## 2019-03-14 NOTE — PROGRESS NOTES
Stanislav Davidson is a 20 y.o. female (: 1995) presenting to address:    Chief Complaint   Patient presents with    Complete Physical       Vitals:    19 1516   BP: 100/72   Pulse: 87   Resp: 20   Temp: 98.2 °F (36.8 °C)   TempSrc: Oral   SpO2: 99%   Weight: 225 lb 12.8 oz (102.4 kg)   Height: 5' 4\" (1.626 m)   PainSc:   0 - No pain   LMP: 2019       Hearing/Vision:      Visual Acuity Screening    Right eye Left eye Both eyes   Without correction: 20/15-1 20/13 20/13   With correction:          Learning Assessment:     Learning Assessment 2013   PRIMARY LEARNER Patient   HIGHEST LEVEL OF EDUCATION - PRIMARY LEARNER  GRADUATED HIGH SCHOOL OR GED   BARRIERS PRIMARY LEARNER NONE   CO-LEARNER CAREGIVER No   PRIMARY LANGUAGE ENGLISH   LEARNER PREFERENCE PRIMARY DEMONSTRATION   LEARNING SPECIAL TOPICS No   ANSWERED BY Patient   RELATIONSHIP SELF     Depression Screening:     3 most recent PHQ Screens 3/14/2019   Little interest or pleasure in doing things Several days   Feeling down, depressed, irritable, or hopeless Nearly every day   Total Score PHQ 2 4     Fall Risk Assessment:     Fall Risk Assessment, last 12 mths 3/14/2019   Able to walk? Yes   Fall in past 12 months? No     Abuse Screening:     Abuse Screening Questionnaire 3/14/2019   Do you ever feel afraid of your partner? N   Are you in a relationship with someone who physically or mentally threatens you? N   Is it safe for you to go home? Y     Coordination of Care Questionaire:   1. Have you been to the ER, urgent care clinic since your last visit? Hospitalized since your last visit? NO    2. Have you seen or consulted any other health care providers outside of the [address] [place] [address] since your last visit? Include any pap smears or colon screening. NO    Advanced Directive:   1. Do you have an Advanced Directive? NO    2. Would you like information on Advanced Directives?  YES

## 2019-03-14 NOTE — PROGRESS NOTES
Assessment/Plan:    1. Routine general medical examination at a health care facility    2. Seasonal affective disorder ([facility] [address]  -improving    3. Screen for STD (sexually transmitted disease)  - HIV 1/2 AG/AB, 4TH GENERATION,W RFLX CONFIRM; Future  - T PALLIDUM AB; Future    4. Screening-pulmonary TB  - XR CHEST PA LAT; Future    5. Screening for cardiovascular condition  - AMB POC EKG ROUTINE W/ 12 LEADS, INTER & REP    6. Class 2 obesity due to excess calories without serious comorbidity with body mass index (BMI) of 38.0 to 38.9 in adult  - REFERRAL TO NUTRITION      The plan was discussed with the patient. The patient verbalized understanding and is in agreement with the plan. All medication potential side effects were discussed with the patient. Health Maintenance:   Health Maintenance   Topic Date Due    HPV Age 9Y-34Y (4 - Female 3-dose series) 10/24/2015    Influenza Age 5 to Adult  08/01/2018    PAP AKA CERVICAL CYTOLOGY  12/01/2020    DTaP/Tdap/Td series (7 - Td) 01/08/2028       Rhoda Simms is a 20 y.o. female and presents with Complete Physical     Subjective:  Here for physical. PHQ 9 shows mild-mod depression. States she has seasonal affective d/o, ever since she was in college. She is not interested in meds. She is going to grad school in [place]. Will be there 2-5 years. The form requires ekg (which i discussed may not be covered) and cxr. ROS:  Constitutional: No recent weight change. No weakness/fatigue. No f/c. Skin: No rashes, change in nails/hair, itching   HENT: No HA, dizziness. No hearing loss/tinnitus. No nasal congestion/discharge. Eyes: No change in vision, double/blurred vision or eye pain/redness. Cardiovascular: No CP/palpitations. No AWAD/orthopnea/PND. Respiratory: No cough/sputum, dyspnea, wheezing. Gastointestinal: No dysphagia, reflux. No n/v. No constipation/diarrhea. No melena/rectal bleeding.    Genitourinary: No dysuria, urinary hesitancy, nocturia, hematuria. No incontinence. Musculoskeletal: No joint pain/stiffness. No muscle pain/tenderness. Endo: No heat/cold intolerance, no polyuria/polydypsia. Heme: No h/o anemia. No easy bleeding/bruising. Allergy/Immunology: No seasonal rhinitis. Denies frequent colds, sinus/ear infections. Neurological: No seizures/numbness/weakness. No paresthesias. Psychiatric:  No depression, anxiety. The problem list was updated as a part of today's visit. Patient Active Problem List   Diagnosis Code    Obesity (BMI 30-39. 9) E66.9    Vitamin D deficiency E55.9    Severe obesity (BMI 35.0-39. 9) E66.01       The PSH, FH were reviewed. SH:  Social History     Tobacco Use    Smoking status: Never Smoker    Smokeless tobacco: Never Used   Substance Use Topics    Alcohol use: No    Drug use: No       Medications/Allergies:  Current Outpatient Medications on File Prior to Visit   Medication Sig Dispense Refill    cholecalciferol, vitamin D3, (VITAMIN D3) 2,000 unit tab Take  by mouth.  multivitamin (ONE A DAY) tablet Take 1 Tab by mouth daily.  fluticasone (FLONASE) 50 mcg/actuation nasal spray nightly.  norethindrone-ethinyl estradiol (MICROGESTIN FE 1/20, 28,) 1 mg-20 mcg (21)/75 mg (7) tab Take  by mouth.  OTHER       ergocalciferol (VITAMIN D2) 50,000 unit capsule Take 1 Cap by mouth every seven (7) days. 12 Cap 0     No current facility-administered medications on file prior to visit.          Allergies   Allergen Reactions    Baby Oil [Lanolin-Mineral Oil] Other (comments)     Red patches on skin       Objective:  Visit Vitals  /72 (BP 1 Location: Left arm, BP Patient Position: Sitting)   Pulse 87   Temp 98.2 °F (36.8 °C) (Oral)   Resp 20   Ht 5' 4\" (1.626 m)   Wt 225 lb 12.8 oz (102.4 kg)   LMP 03/03/2019   SpO2 99%   BMI 38.76 kg/m²      Constitutional: Well developed, nourished, no distress, alert, obese habitus   HENT: Exterior ears and tympanic membranes normal bilaterally. Supple neck. No thyromegaly or lymphadenopathy. Oropharynx clear and moist mucous membranes. Normal inferior turbinates. Septum midline. Eyes: Conjunctiva normal. PERRL. Eyelids normal.   CV: S1, S2.  RRR. No murmurs/rubs. No thrills palpated. No carotid bruits. Intact distal pulses. No edema. No aortic bruits. Pulm: No abnormalities on inspection. Clear to auscultation bilaterally. No wheezing/rhonchi. Normal effort. GI: Soft, nontender, nondistended. Normal active bowel sounds. MS: Gait normal.  Joints without deformity/tenderness. Strength intact bilateral upper and lower ext. Normal ROM all extremities. Neuro: A/O x 3. No focal motor or sensory deficits. Speech normal.   Skin: No lesions/rashes on inspection. Psych: Appropriate affect, judgement and insight. Short-term memory intact. Labwork and Ancillary Studies:    CBC w/Diff  Lab Results   Component Value Date/Time    WBC 6.3 03/13/2019 11:32 AM    HGB 11.8 (L) 03/13/2019 11:32 AM    PLATELET 088 67/35/8197 11:32 AM         Basic Metabolic Profile/LFTs  Lab Results   Component Value Date/Time    Sodium 139 03/13/2019 11:32 AM    Potassium 4.2 03/13/2019 11:32 AM    Chloride 107 03/13/2019 11:32 AM    CO2 28 03/13/2019 11:32 AM    Anion gap 4 03/13/2019 11:32 AM    Glucose 76 03/13/2019 11:32 AM    BUN 6 (L) 03/13/2019 11:32 AM    Creatinine 0.79 03/13/2019 11:32 AM    BUN/Creatinine ratio 8 (L) 03/13/2019 11:32 AM    GFR est AA >60 03/13/2019 11:32 AM    GFR est non-AA >60 03/13/2019 11:32 AM    Calcium 8.7 03/13/2019 11:32 AM      Lab Results   Component Value Date/Time    ALT (SGPT) 15 03/13/2019 11:32 AM    AST (SGOT) 9 (L) 03/13/2019 11:32 AM    Alk.  phosphatase 88 03/13/2019 11:32 AM    Bilirubin, total 0.2 03/13/2019 11:32 AM     3 most recent PHQ Screens 3/14/2019   Little interest or pleasure in doing things Several days   Feeling down, depressed, irritable, or hopeless Nearly every day Total Score PHQ 2 4   Trouble falling or staying asleep, or sleeping too much More than half the days   Feeling tired or having little energy Nearly every day   Poor appetite, weight loss, or overeating Nearly every day   Feeling bad about yourself - or that you are a failure or have let yourself or your family down Not at all   Trouble concentrating on things such as school, work, reading, or watching TV Several days   Moving or speaking so slowly that other people could have noticed; or the opposite being so fidgety that others notice Several days   Thoughts of being better off dead, or hurting yourself in some way Not at all   PHQ 9 Score 14   How difficult have these problems made it for you to do your work, take care of your home and get along with others Somewhat difficult         Cholesterol  Lab Results   Component Value Date/Time    Cholesterol, total 140 03/13/2019 11:32 AM    HDL Cholesterol 66 (H) 03/13/2019 11:32 AM    LDL, calculated 64 03/13/2019 11:32 AM    Triglyceride 50 03/13/2019 11:32 AM    CHOL/HDL Ratio 2.1 03/13/2019 11:32 AM     EKG: NSR, no ST changes. No sign of ischemia.

## 2019-03-15 ENCOUNTER — HOSPITAL ENCOUNTER (OUTPATIENT)
Dept: LAB | Age: 24
Discharge: HOME | End: 2019-03-15
Payer: COMMERCIAL

## 2019-03-15 DIAGNOSIS — Z11.3 SCREEN FOR STD (SEXUALLY TRANSMITTED DISEASE): Primary | ICD-10-CM

## 2019-03-15 DIAGNOSIS — Z11.3 SCREEN FOR STD (SEXUALLY TRANSMITTED DISEASE): ICD-10-CM

## 2019-03-15 LAB — T PALLIDUM AB SER QL IA: NONREACTIVE

## 2019-03-15 PROCEDURE — 36415 COLL VENOUS BLD VENIPUNCTURE: CPT

## 2019-03-15 PROCEDURE — 87389 HIV-1 AG W/HIV-1&-2 AB AG IA: CPT

## 2019-03-20 LAB
HIV 1+2 AB+HIV1 P24 AG SERPL QL IA: NONREACTIVE
HIV12 RESULT COMMENT, HHIVC: NORMAL

## 2019-04-10 ENCOUNTER — HOSPITAL ENCOUNTER (OUTPATIENT)
Dept: NUTRITION | Age: 24
Discharge: HOME | End: 2019-04-10
Payer: COMMERCIAL

## 2019-04-10 PROCEDURE — 97802 MEDICAL NUTRITION INDIV IN: CPT

## 2019-04-10 NOTE — PROGRESS NOTES
[address] [place] [address] 51, 45 [facility], [place], 70 [facility] Phone: [phone]  Fax: [phone] Nutrition Assessment  Medical Nutrition Therapy Outpatient Initial Evaluation Patient Name: Tayler Black : 1995 Treatment Diagnosis: obesity Referral Source: Davis Goodson MD Start of Care [facility]): 4/10/2019 Gender: female Age: 20 y.o. Ht: 64 in Wt:  224.8 lb BMI: 38.5 BMR Male  [facility] Female 6165 Anthropometrics Assessment: Obesity stage 1 Past Medical History includes: Obesity, depression Pertinent Medications:  
n/a Biochemical Data:  
No results found for: HBA1C, HGBE8, SQM2KKDO, [id number] Lab Results Component Value Date/Time Cholesterol, total 140 2019 11:32 AM  
 HDL Cholesterol 66 (H) 2019 11:32 AM  
 LDL, calculated 64 2019 11:32 AM  
 VLDL, calculated 10 2019 11:32 AM  
 Triglyceride 50 2019 11:32 AM  
 CHOL/HDL Ratio 2.1 2019 11:32 AM  
 
Lab Results Component Value Date/Time ALT (SGPT) 15 2019 11:32 AM  
 AST (SGOT) 9 (L) 2019 11:32 AM  
 Alk. phosphatase 88 2019 11:32 AM  
 Bilirubin, total 0.2 2019 11:32 AM  
 
Lab Results Component Value Date/Time Creatinine 0.79 2019 11:32 AM  
 
Lab Results Component Value Date/Time BUN 6 (L) 2019 11:32 AM  
 
No results found for: MCACR, MCA1, MCA2, MCA3, MCAU, MCAU2, MCALPOCT Subjective/Assessment: 
 Patient seen today in nutrition for weight management for obesity. Patient reports goal weight is 165-170 pounds. Reports in the last 1.5 years she has increased 40+ pounds. Reports has been lacto-ovo vegetarian since 2018. Reports she is lactose intolerant but will occasionally eat cheese. Nutrition Diagnosis: Obesity related to increased PO intake and lack of activity as evidenced by high BMI and unhealthy diet and exercise recall. Current Eating Patterns: Diet recall: 
Breakfast: coconut or soy yogurt with granola, fruit, handful of nuts, boiled egg or egg sandwich and plain hot tea. Lunch: leftovers or vegetables and 1 can beans Snack/dinner: reports normally eats 2 meals during the day and either a small dinner or just snacks in the evening. Dark chocolate, fruit, granola bar. Eats out and gets a quesadilla, or burrito with beans No alcohol. No soda. No food allergies. Lactose intolerant; no milk or yogurt; does consume cheese occasionally. Reports using almond milk in place of cow's milk. Exercise: occasionally a yoga class 1 time per week. 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 Estimate Needs Calories:  1750 Protein: 80 Carbs: 135 Kcal/day  g/day  g/day Education provided: Intervention: Education, Comprehensive - reviewed prescription below. Nutrition education provided on medical nutrition therapy for obesity. Patient was educated on the food sources of protein, carbohydrate and non-starchy vegetables. Educated on food label reading and appropriate portion sizes. Developed a meal plan with multiple options for each meal and snacks. Provided options for healthy meals when eating out. Provided with my contact info. Patient asked questions and indicated understanding. Handouts Provided: [x]  Carbohydrates [x]  Protein 
[x]  Fiber 
[x]  Serving Sizes [x]  Meal Ideas 
[x]  Non-starchy Vegetables []  Diabetes []  Cholesterol 
[]  Sodium [x]  Food labels [x]  Snacks 
[]  Others:  
Information Reviewed with: patient Readiness to Change Stage: []  Pre-contemplative    [x]  Contemplative 
[]  Preparation               []  Action                  []  Maintenance Potential Barriers to Learning: []  Decline in memory    []  Language barrier   []  Other: 
[]  Emotional                  []  Limited mobility []  Lack of motivation     [] Vision, hearing or cognitive impairment Expected Compliance: Good Nutritional Goal - To promote lifestyle changes to result in:   
[x]  Weight loss 
[]  Improved diabetic control 
[]  Decreased cholesterol levels 
[]  Decreased blood pressure 
[]  Weight maintenance []  Preventing any interactions associated with food allergies []  Adequate weight gain toward goal weight 
[]  Other:  
  
 
Nutrition Prescription/ 
Patient Goals: ? Limit intake of carbohydrates to no more than. o 45 grams of carbohydrate per meal 
o Limit snacks throughout the day to a MAX of 30 grams of carb total. 
 
? Keep condiments/dressing/sauces at 2 grams of net carb or less. ? Have 3 meals per day and 1-2 snacks (as needed). ? Aim for at least 3 ounces (deck of cards) of a protein choice at every meal and 1-2 ounces at snack times. Goal 80 grams per day. ? Choose one or more vegetables to include with lunch, dinner and snacks (as needed). ? Choose grains with >3 grams of Dietary Fiber per serving. ? Have at least 64 ounces of fluids per day. Choose sugar-free beverages only. Dietitian Signature: Nicolas Bennett MS, RD Date: 4/10/2019 Follow-up: Patient to call to schedule Time: 10:52 AM